# Patient Record
Sex: FEMALE | Race: WHITE | Employment: OTHER | URBAN - METROPOLITAN AREA
[De-identification: names, ages, dates, MRNs, and addresses within clinical notes are randomized per-mention and may not be internally consistent; named-entity substitution may affect disease eponyms.]

---

## 2022-07-24 ENCOUNTER — HOSPITAL ENCOUNTER (EMERGENCY)
Age: 72
Discharge: HOME | End: 2022-07-24

## 2022-07-24 VITALS
RESPIRATION RATE: 20 BRPM | SYSTOLIC BLOOD PRESSURE: 159 MMHG | WEIGHT: 145 LBS | HEIGHT: 63 IN | BODY MASS INDEX: 25.69 KG/M2 | DIASTOLIC BLOOD PRESSURE: 83 MMHG | OXYGEN SATURATION: 99 % | HEART RATE: 81 BPM

## 2022-07-24 DIAGNOSIS — R04.0 EPISTAXIS: Primary | ICD-10-CM

## 2022-07-24 PROCEDURE — 6370000000 HC RX 637 (ALT 250 FOR IP): Performed by: PHYSICIAN ASSISTANT

## 2022-07-24 PROCEDURE — 30903 CONTROL OF NOSEBLEED: CPT

## 2022-07-24 PROCEDURE — 99283 EMERGENCY DEPT VISIT LOW MDM: CPT

## 2022-07-24 RX ORDER — LOSARTAN POTASSIUM 50 MG/1
50 TABLET ORAL DAILY
COMMUNITY
Start: 2021-09-09

## 2022-07-24 RX ORDER — AMOXICILLIN AND CLAVULANATE POTASSIUM 875; 125 MG/1; MG/1
1 TABLET, FILM COATED ORAL 2 TIMES DAILY
Qty: 14 TABLET | Refills: 0 | Status: SHIPPED | OUTPATIENT
Start: 2022-07-24 | End: 2022-07-31

## 2022-07-24 RX ORDER — ATORVASTATIN CALCIUM 20 MG/1
20 TABLET, FILM COATED ORAL DAILY
COMMUNITY
Start: 2021-09-09

## 2022-07-24 RX ORDER — AMOXICILLIN AND CLAVULANATE POTASSIUM 875; 125 MG/1; MG/1
1 TABLET, FILM COATED ORAL ONCE
Status: COMPLETED | OUTPATIENT
Start: 2022-07-24 | End: 2022-07-24

## 2022-07-24 RX ADMIN — AMOXICILLIN AND CLAVULANATE POTASSIUM 1 TABLET: 875; 125 TABLET, FILM COATED ORAL at 19:49

## 2022-07-24 ASSESSMENT — PAIN - FUNCTIONAL ASSESSMENT: PAIN_FUNCTIONAL_ASSESSMENT: NONE - DENIES PAIN

## 2022-07-24 NOTE — ED PROVIDER NOTES
[facility] Emergency Department    CHIEF COMPLAINT  Epistaxis (Pt reports nose bleed started approx 45 mins ago without acute injury. Pt reports bilateral nostrils. Pt denies any blood thinners. Pt has been holding pressure since onset, continues to have bleeding. )      SHARED SERVICE VISIT:  Evaluated by KEVIN. My supervising physician was available for consultation. HISTORY OF PRESENT ILLNESS  Francois Duane is a 67 y.o. female who presents to the ED complaining of nosebleed. The patient reports she was drying her hair when she suddenly noticed blood dripping from her left nostril. She is not on a blood thinner. The nose bleed started approximately 45 minutes ago. She denies any direct trauma initially, however upon further discussion she does admit to an at home COVID test earlier today. She denies any history of nose bleeds. No lightheadedness or dizziness. No chest pain or shortness of breath. No other complaints, modifying factors or associated symptoms. Nursing notes reviewed. History reviewed. No pertinent past medical history. History reviewed. No pertinent surgical history. History reviewed. No pertinent family history.   Social History     Socioeconomic History    Marital status:      Spouse name: Not on file    Number of children: Not on file    Years of education: Not on file    Highest education level: Not on file   Occupational History    Not on file   Tobacco Use    Smoking status: Never    Smokeless tobacco: Never   Substance and Sexual Activity    Alcohol use: Not on file     Comment: occ    Drug use: Not Currently    Sexual activity: Not on file   Other Topics Concern    Not on file   Social History Narrative    Not on file     Social Determinants of Health     Financial Resource Strain: Not on file   Food Insecurity: Not on file   Transportation Needs: Not on file   Physical Activity: Not on file   Stress: Not on file   Social Connections: Not on file   Intimate Partner Violence: Not on file   Housing Stability: Not on file     No current facility-administered medications for this encounter. Current Outpatient Medications   Medication Sig Dispense Refill    atorvastatin (LIPITOR) 20 MG tablet Take 20 mg by mouth in the morning. losartan (COZAAR) 50 MG tablet Take 50 mg by mouth in the morning. No Known Allergies    REVIEW OF SYSTEMS  6 systems reviewed, pertinent positives per HPI otherwise noted to be negative    PHYSICAL EXAM  BP (!) 174/104   Pulse (!) 118   Resp 24   Ht 5' 3\" (1.6 m)   Wt 145 lb (65.8 kg)   SpO2 97%   BMI 25.69 kg/m²   GENERAL APPEARANCE: Awake and alert. Cooperative. No acute distress. HEAD: Normocephalic. Atraumatic. EYES: PERRL. EOM's grossly intact. ENT: Mucous membranes are moist.  Profuse bleeding from left nostril. No bleeding from right nostril. NECK: Supple. Normal ROM. CHEST: Equal symmetric chest rise. LUNGS: Breathing is unlabored. Speaking comfortably in full sentences. Abdomen: Nondistended  EXTREMITIES: MAEE. No acute deformities. SKIN: Warm and dry. NEUROLOGICAL: Alert and oriented. Strength is 5/5 in all extremities and sensation is intact. RADIOLOGY  No results found. ED COURSE  Patient was evaluated in the emergency department today for a nosebleed. The patient had profuse bleeding on exam that was not controllable with compression therefore a rhino rocket was placed. I discussed risks, benefits, and alternatives with the patient, who agreed to the procedure The patient tolerated the procedure well. A discussion was had with Ms. Prinner regarding follow up with ENT in the next 48-72 hours. She will be placed on prophylactic antibiotics. Risk management discussed and shared decision making had with patient and/or surrogate. All questions were answered. Patient will return to ED for new/worsening symptoms.     Patient was sent home with a prescription for Augmentin. MDM    I estimate there is LOW risk for a ANAPHYLAXIS, DEEP SPACE INFECTION (e.g., IZABELS ANGINA OR RETROPHARYNGEAL ABSCESS), EPIGLOTTITIS, MENINGITIS, or AIRWAY COMPROMISE, thus I consider the discharge disposition reasonable. Also, there is no evidence or peritonitis, sepsis, or toxicity. Zulema Sneed and I have discussed the diagnosis and risks, and we agree with discharging home to follow-up with their primary doctor. We also discussed returning to the Emergency Department immediately if new or worsening symptoms occur. We have discussed the symptoms which are most concerning (e.g., changing or worsening pain, trouble swallowing or breathing, neck stiffness or fever) that necessitate immediate return. Final Impression    1. Epistaxis        Discharge Vital Signs:  Blood pressure (!) 159/83, pulse 81, resp. rate 20, height 5' 3\" (1.6 m), weight 145 lb (65.8 kg), SpO2 99 %. DISPOSITION  Patient was discharged to home in good condition.             Michele Parson  07/24/22 2004

## 2022-07-24 NOTE — DISCHARGE INSTRUCTIONS
Avoid NSAIDs for 3-4 days. Follow up with ENT in the next 48-72 hours. Continue antibiotic until the packing has been removed.

## 2022-07-27 ENCOUNTER — OFFICE VISIT (OUTPATIENT)
Dept: ENT CLINIC | Age: 72
End: 2022-07-27

## 2022-07-27 VITALS — HEIGHT: 63 IN | WEIGHT: 145 LBS | TEMPERATURE: 96.3 F | BODY MASS INDEX: 25.69 KG/M2

## 2022-07-27 DIAGNOSIS — J34.2 NASAL SEPTAL DEVIATION: ICD-10-CM

## 2022-07-27 DIAGNOSIS — R04.0 EPISTAXIS: Primary | ICD-10-CM

## 2022-07-27 PROCEDURE — 31238 NSL/SINS NDSC SRG NSL HEMRRG: CPT | Performed by: OTOLARYNGOLOGY

## 2022-07-27 PROCEDURE — 99203 OFFICE O/P NEW LOW 30 MIN: CPT | Performed by: OTOLARYNGOLOGY

## 2022-07-27 PROCEDURE — 1123F ACP DISCUSS/DSCN MKR DOCD: CPT | Performed by: OTOLARYNGOLOGY

## 2022-07-27 RX ORDER — ECHINACEA PURPUREA EXTRACT 125 MG
1 TABLET ORAL
Qty: 60 ML | Refills: 5 | Status: SHIPPED | OUTPATIENT
Start: 2022-07-27 | End: 2022-08-26

## 2022-07-27 ASSESSMENT — ENCOUNTER SYMPTOMS
ABDOMINAL PAIN: 0
WHEEZING: 0
SHORTNESS OF BREATH: 0

## 2022-07-27 NOTE — PATIENT INSTRUCTIONS
-Start triple antibiotic ointment nightly to the bilateral nares for the next 4 to 5 days as a nasal emmolient  May use vaseline topically once course of nasal antibiotics is completed.  Patient instructed to gently place a dime sized amount of ointment on the nasal sill nightly.   -Nasal saline sprays 4-5 times daily for nasal moisturization  -Avoid digital nasal trauma   -Avoid nose blowing for the next 4-5 days to encourage mucosal healing   -Room humidification recommended

## 2022-07-27 NOTE — PROGRESS NOTES
[facility] [address]  [place], [place] [place]  P: [phone]       Patient     Zulema Dent  1950    Chief Concern     Chief Complaint   Patient presents with    New Patient     Patient had a nosebleed on 7/24/22 and had a rhino rocket placed at the ED. She is here to have it removed       Assessment and Plan      Diagnosis Orders   1. Epistaxis  sodium chloride (ALTAMIST SPRAY) 0.65 % nasal spray    [place] NASAL/SINUS SCOPY,W/CONTROL NASAL HEM      2. Nasal septal deviation          Patient with epistaxis from the mid/posterior septum and left lateral nasal wall above the inferior turbinate, sites cauterized with AgNO3, Surgicel placed. Nasal endoscopy without epistaxis of the right side. Conservative recommendations for epistaxis outlined, we have asked her to call back with any further concerns. Return if symptoms worsen or fail to improve. History of Present Illness     43-year-old male with epistaxis 7/24/2022, controlled in the emergency department with rapid Rhino packing to the left side. She states she performed no further epistaxis, presents for Trot removal.  Denies history of chronic nosebleeds, head/neck trauma, not currently on any blood thinners. Past Medical History     History reviewed. No pertinent past medical history. Past Surgical History     History reviewed. No pertinent surgical history. Family History     History reviewed. No pertinent family history. Social History     Social History     Tobacco Use    Smoking status: Never    Smokeless tobacco: Never   Substance Use Topics    Drug use: Not Currently        Allergies     No Known Allergies    Medications     Current Outpatient Medications   Medication Sig Dispense Refill    sodium chloride (ALTAMIST SPRAY) 0.65 % nasal spray 1 spray by Nasal route every 2 hours (while awake) 60 mL 5    atorvastatin (LIPITOR) 20 MG tablet Take 20 mg by mouth in the morning.       losartan (COZAAR) 50 MG tablet Take 50 mg by mouth in the morning. amoxicillin-clavulanate (AUGMENTIN) 875-125 MG per tablet Take 1 tablet by mouth in the morning and 1 tablet before bedtime. Do all this for 7 days. 14 tablet 0     No current facility-administered medications for this visit. Review of Systems     Review of Systems   Constitutional:  Negative for activity change, chills, diaphoresis, fatigue and fever. HENT:  Positive for ear pain and nosebleeds. Negative for congestion, ear discharge and hearing loss. Eyes:  Negative for visual disturbance. Respiratory:  Negative for shortness of breath and wheezing. Cardiovascular:  Negative for chest pain. Gastrointestinal:  Negative for abdominal pain. Musculoskeletal:  Negative for neck pain and neck stiffness. Skin:  Negative for rash. Neurological:  Negative for dizziness, light-headedness and headaches. Hematological:  Negative for adenopathy. PhysicalExam     Vitals:    07/27/22 0905   Temp: (!) 96.3 °F (35.7 °C)   Weight: 145 lb (65.8 kg)   Height: 5' 3\" (1.6 m)       Physical Exam  Vitals reviewed. Constitutional:       Appearance: Normal appearance. HENT:      Head: Normocephalic and atraumatic. Right Ear: Tympanic membrane, ear canal and external ear normal. There is no impacted cerumen. Left Ear: Tympanic membrane, ear canal and external ear normal. There is no impacted cerumen. Ears:      San exam findings: Does not lateralize. Right Rinne: AC > BC. Left Rinne: AC > BC. Nose: Septal deviation present. No congestion or rhinorrhea. Comments: Rapid Rhino packing in left nare, deflated and removed, left mid septal deviation with bloody crusting, no rapid hemorrhage     Mouth/Throat:      Lips: Pink. No lesions. Mouth: Mucous membranes are moist. No oral lesions. Tongue: No lesions. Tongue does not deviate from midline. Palate: No mass and lesions.       Pharynx: Oropharynx is discharge, 1 = clear thin discharge, 2 = thick purulent discharge)    Nasopharynx:     ustachean tube orifices, Fossae of Rosenmuller and posterior nasopharyngeal wall clear without masses    Septum: intact and deviated to the left in the midportion-there is slow oozing from the left mid septum and left lateral nasal wall above the inferior turbinate, this was cauterized with AgNO3 with cessation of oozing, and Surgicel placed over the septum to prevent synechiae     Tolerated well without complication. I attest that I was present for and did the entire procedure myself. Radha Mendez MD  7/27/22    Portions of this note were dictated using Dragon.  There may be linguistic errors secondary to the use of this program.

## 2022-07-28 ENCOUNTER — OFFICE VISIT (OUTPATIENT)
Dept: ENT CLINIC | Age: 72
End: 2022-07-28

## 2022-07-28 VITALS
WEIGHT: 145 LBS | TEMPERATURE: 96.6 F | DIASTOLIC BLOOD PRESSURE: 89 MMHG | BODY MASS INDEX: 25.69 KG/M2 | HEIGHT: 63 IN | SYSTOLIC BLOOD PRESSURE: 157 MMHG

## 2022-07-28 DIAGNOSIS — R04.0 EPISTAXIS: Primary | ICD-10-CM

## 2022-07-28 PROCEDURE — 31238 NSL/SINS NDSC SRG NSL HEMRRG: CPT | Performed by: OTOLARYNGOLOGY

## 2022-07-28 PROCEDURE — 99213 OFFICE O/P EST LOW 20 MIN: CPT | Performed by: OTOLARYNGOLOGY

## 2022-07-28 PROCEDURE — 1123F ACP DISCUSS/DSCN MKR DOCD: CPT | Performed by: OTOLARYNGOLOGY

## 2022-07-28 ASSESSMENT — ENCOUNTER SYMPTOMS
SHORTNESS OF BREATH: 0
ABDOMINAL PAIN: 0
WHEEZING: 0

## 2022-07-28 NOTE — PROGRESS NOTES
[facility] [address]  [place] [place]  P: [phone]       Patient     Angelo Ritter  1950    Chief Concern     Chief Complaint   Patient presents with    Epistaxis     Patient states that her nose started bleeding again this morning       Assessment and Plan      Diagnosis Orders   1. Epistaxis  [place] NASAL/SINUS SCOPY,W/CONTROL NASAL HEM        Patient with epistaxis from the mid/posterior septum and left lateral nasal wall above the inferior turbinate, sites cauterized with AgNO3, Surgicel placed yesterday. Rebleed again this morning, with bleeding from the mid septum-site was cauterized with AgNO3, and hemopore was placed no posterior or anterior hemorrhages noted, we will have her use saline sprays, Vaseline to the nose and call us back with any further hemorrhage. No follow-ups on file. History of Present Illness     77-year-old male with epistaxis 7/24/2022, controlled in the emergency department with rapid Rhino packing to the left side. She states she performed no further epistaxis, presents for [employer] removal.  Denies history of chronic nosebleeds, head/neck trauma, not currently on any blood thinners. Interval history 7/28/2022: Patient states she was in her car this morning at approximately 0715 when she had a rebleed from the left nasal passage    Past Medical History     History reviewed. No pertinent past medical history. Past Surgical History     History reviewed. No pertinent surgical history. Family History     History reviewed. No pertinent family history.     Social History     Social History     Tobacco Use    Smoking status: Never    Smokeless tobacco: Never   Substance Use Topics    Drug use: Not Currently        Allergies     No Known Allergies    Medications     Current Outpatient Medications   Medication Sig Dispense Refill    sodium chloride (ALTAMIST SPRAY) 0.65 % nasal spray 1 spray by Nasal route every 2 hours (while awake) 60 mL 5    atorvastatin (LIPITOR) 20 MG tablet Take 20 mg by mouth in the morning. losartan (COZAAR) 50 MG tablet Take 50 mg by mouth in the morning. amoxicillin-clavulanate (AUGMENTIN) 875-125 MG per tablet Take 1 tablet by mouth in the morning and 1 tablet before bedtime. Do all this for 7 days. 14 tablet 0     No current facility-administered medications for this visit. Review of Systems     Review of Systems   Constitutional:  Negative for activity change, chills, diaphoresis, fatigue and fever. HENT:  Positive for ear pain and nosebleeds. Negative for congestion, ear discharge and hearing loss. Eyes:  Negative for visual disturbance. Respiratory:  Negative for shortness of breath and wheezing. Cardiovascular:  Negative for chest pain. Gastrointestinal:  Negative for abdominal pain. Musculoskeletal:  Negative for neck pain and neck stiffness. Skin:  Negative for rash. Neurological:  Negative for dizziness, light-headedness and headaches. Hematological:  Negative for adenopathy. PhysicalExam     Vitals:    07/28/22 1018 07/28/22 1119   BP: (!) 154/85 (!) 157/89   Site: Right Upper Arm Right Upper Arm   Position: Sitting Sitting   Temp: (!) 96.6 °F (35.9 °C)    Weight: 145 lb (65.8 kg)    Height: 5' 3\" (1.6 m)        Physical Exam  Vitals reviewed. Constitutional:       Appearance: Normal appearance. HENT:      Head: Normocephalic and atraumatic. Right Ear: Tympanic membrane, ear canal and external ear normal. There is no impacted cerumen. Left Ear: Tympanic membrane, ear canal and external ear normal. There is no impacted cerumen. Ears:      San exam findings: Does not lateralize. Right Rinne: AC > BC. Left Rinne: AC > BC. Nose: Septal deviation present. No congestion or rhinorrhea. Left Nostril: Epistaxis present. Comments: Slow oozing from the left nasal passage     Mouth/Throat:      Lips: Pink. No lesions.       Mouth: Mucous membranes are moist. No oral lesions. Tongue: No lesions. Tongue does not deviate from midline. Palate: No mass and lesions. Pharynx: Oropharynx is clear. Uvula midline. No oropharyngeal exudate or posterior oropharyngeal erythema. Eyes:      Extraocular Movements: Extraocular movements intact. Pupils: Pupils are equal, round, and reactive to light. Musculoskeletal:      Cervical back: Normal range of motion and neck supple. Lymphadenopathy:      Cervical: No cervical adenopathy. Neurological:      Mental Status: She is alert. Cranial Nerves: No cranial nerve deficit. Data Review        Procedure     Nasal Endoscopy and control of epistaxis    Pre OP: Epistaxis, left nasal passage  Post OP: Same    Verbal consent was received. After topical anesthesia and decongestion had been obtained using aerosolized 1% lidocaine and oxymetazoline, a 0 degree rigid endoscope was placed into both nares with the patient in a sitting position.  The following was observed:    Right Nasal Cavity and Paranasal Sinuses:  Polyp score = 0 (0 = no polyps, 1 = small polyps in middle meatus not reaching below the inferior border of the middle og, 2 = polyps reaching below the middle border of the middle turbinate, 3= large polyps reaching the lower border of the inferior turbinate or polyps medial to the middle og, 4= large polyps causing almost complete congestion/obstruction of the interior meatus)  Edema score = 1 (0 = absent, 1 = mild, 2 = severe)  Discharge score = 1 (0 = no discharge, 1 = clear thin discharge, 2 = thick purulent discharge)    Left Nasal Cavity and Paranasal Sinuses:    Polyp score = 0 (0 = no polyps, 1 = small polyps in middle meatus not reaching below the inferior border of the middle og, 2 = polyps reaching below the middle border of the middle turbinate, 3= large polyps reaching the lower border of the inferior turbinate or polyps medial to the middle

## 2022-08-01 ENCOUNTER — OFFICE VISIT (OUTPATIENT)
Dept: ENT CLINIC | Age: 72
End: 2022-08-01
Payer: MEDICARE

## 2022-08-01 ENCOUNTER — TELEPHONE (OUTPATIENT)
Dept: ENT CLINIC | Age: 72
End: 2022-08-01

## 2022-08-01 VITALS
HEIGHT: 63 IN | DIASTOLIC BLOOD PRESSURE: 81 MMHG | WEIGHT: 145 LBS | BODY MASS INDEX: 25.69 KG/M2 | TEMPERATURE: 96.8 F | SYSTOLIC BLOOD PRESSURE: 133 MMHG

## 2022-08-01 DIAGNOSIS — R04.0 EPISTAXIS: Primary | ICD-10-CM

## 2022-08-01 PROCEDURE — 31238 NSL/SINS NDSC SRG NSL HEMRRG: CPT | Performed by: OTOLARYNGOLOGY

## 2022-08-01 PROCEDURE — 1123F ACP DISCUSS/DSCN MKR DOCD: CPT | Performed by: OTOLARYNGOLOGY

## 2022-08-01 ASSESSMENT — ENCOUNTER SYMPTOMS
ABDOMINAL PAIN: 0
WHEEZING: 0
SHORTNESS OF BREATH: 0

## 2022-08-01 NOTE — TELEPHONE ENCOUNTER
I called and spoke with the patient.  She is going to come in to see Dr. Goldman Said at 10:30 today

## 2022-08-01 NOTE — PATIENT INSTRUCTIONS
Use nasal saline sprays every 4-6 hours in the left side, as often as desired in the right side  Vaseline to the left nasal passage nightly  Avoid wiping nose, blowing nose for the next 4-5 days

## 2022-08-01 NOTE — TELEPHONE ENCOUNTER
Patient called and stated her nose was bleeding a lot at work this morning. She said the bleeding was pretty well controlled through the weekend. She is wondering if another physician needs to look at it if maybe it needs \"another set of eyes\". She was in office twice last week.     Please advise   [phone]

## 2022-08-01 NOTE — PROGRESS NOTES
Topics    Drug use: Not Currently        Allergies     No Known Allergies    Medications     Current Outpatient Medications   Medication Sig Dispense Refill    sodium chloride (ALTAMIST SPRAY) 0.65 % nasal spray 1 spray by Nasal route every 2 hours (while awake) 60 mL 5    atorvastatin (LIPITOR) 20 MG tablet Take 20 mg by mouth in the morning. losartan (COZAAR) 50 MG tablet Take 50 mg by mouth in the morning. No current facility-administered medications for this visit. Review of Systems     Review of Systems   Constitutional:  Negative for activity change, chills, diaphoresis, fatigue and fever. HENT:  Positive for ear pain and nosebleeds. Negative for congestion, ear discharge and hearing loss. Eyes:  Negative for visual disturbance. Respiratory:  Negative for shortness of breath and wheezing. Cardiovascular:  Negative for chest pain. Gastrointestinal:  Negative for abdominal pain. Musculoskeletal:  Negative for neck pain and neck stiffness. Skin:  Negative for rash. Neurological:  Negative for dizziness, light-headedness and headaches. Hematological:  Negative for adenopathy. PhysicalExam     Vitals:    08/01/22 1033   BP: 133/81   Site: Right Upper Arm   Position: Sitting   Temp: 96.8 °F (36 °C)   Weight: 145 lb (65.8 kg)   Height: 5' 3\" (1.6 m)       Physical Exam  Vitals reviewed. Constitutional:       Appearance: Normal appearance. HENT:      Head: Normocephalic and atraumatic. Right Ear: Tympanic membrane, ear canal and external ear normal. There is no impacted cerumen. Left Ear: Tympanic membrane, ear canal and external ear normal. There is no impacted cerumen. Ears:      San exam findings: Does not lateralize. Right Rinne: AC > BC. Left Rinne: AC > BC. Nose: Septal deviation present. No congestion or rhinorrhea. Left Nostril: Epistaxis present.       Comments: Slow oozing from the left nasal passage     Mouth/Throat:      Lips: Pink. No lesions. Mouth: Mucous membranes are moist. No oral lesions. Tongue: No lesions. Tongue does not deviate from midline. Palate: No mass and lesions. Pharynx: Oropharynx is clear. Uvula midline. No oropharyngeal exudate or posterior oropharyngeal erythema. Eyes:      Extraocular Movements: Extraocular movements intact. Pupils: Pupils are equal, round, and reactive to light. Musculoskeletal:      Cervical back: Normal range of motion and neck supple. Lymphadenopathy:      Cervical: No cervical adenopathy. Neurological:      Mental Status: She is alert. Cranial Nerves: No cranial nerve deficit. Data Review        Procedure     Nasal Endoscopy and control of epistaxis    Pre OP: Epistaxis, left nasal passage  Post OP: Same    Verbal consent was received. After topical anesthesia and decongestion had been obtained using aerosolized 1% lidocaine and oxymetazoline, a 0 degree rigid endoscope was placed into both nares with the patient in a sitting position.  The following was observed:    Right Nasal Cavity and Paranasal Sinuses:  Polyp score = 0 (0 = no polyps, 1 = small polyps in middle meatus not reaching below the inferior border of the middle og, 2 = polyps reaching below the middle border of the middle turbinate, 3= large polyps reaching the lower border of the inferior turbinate or polyps medial to the middle og, 4= large polyps causing almost complete congestion/obstruction of the interior meatus)  Edema score = 1 (0 = absent, 1 = mild, 2 = severe)  Discharge score = 1 (0 = no discharge, 1 = clear thin discharge, 2 = thick purulent discharge)    Left Nasal Cavity and Paranasal Sinuses:    Polyp score = 0 (0 = no polyps, 1 = small polyps in middle meatus not reaching below the inferior border of the middle og, 2 = polyps reaching below the middle border of the middle turbinate, 3= large polyps reaching the lower border of the inferior turbinate or polyps medial to the middle og, 4= large polyps causing almost complete congestion/obstruction of the interior meatus)  Edema score = 1 (0 = absent, 1 = mild, 2 = severe)  Discharge score = 1 (0 = no discharge, 1 = clear thin discharge, 2 = thick purulent discharge)    Septum: intact and deviated to the left in the midportion-there is slow oozing from the left anterior septum  this was cauterized with AgNO3 with cessation of oozing, and hemopore coated in antibiotic ointment placed in the left nasal passage with cessation of hemorrhage     Tolerated well without complication. I attest that I was present for and did the entire procedure myself. Radha Díaz MD  8/1/22    Portions of this note were dictated using Dragon.  There may be linguistic errors secondary to the use of this program. clear